# Patient Record
Sex: FEMALE | Race: WHITE | Employment: OTHER | RURAL
[De-identification: names, ages, dates, MRNs, and addresses within clinical notes are randomized per-mention and may not be internally consistent; named-entity substitution may affect disease eponyms.]

---

## 2017-02-27 ENCOUNTER — OFFICE VISIT (OUTPATIENT)
Dept: FAMILY MEDICINE CLINIC | Age: 82
End: 2017-02-27

## 2017-02-27 VITALS
DIASTOLIC BLOOD PRESSURE: 70 MMHG | HEART RATE: 70 BPM | OXYGEN SATURATION: 94 % | WEIGHT: 147 LBS | RESPIRATION RATE: 20 BRPM | SYSTOLIC BLOOD PRESSURE: 130 MMHG

## 2017-02-27 DIAGNOSIS — K13.70 MOUTH LESION: Primary | ICD-10-CM

## 2017-02-27 NOTE — PROGRESS NOTES
Chief Complaint   Patient presents with    Gum Problem         HPI:       is a 80 y.o. female.  recently . She has dementia. No recent behavioral challenges. Noted to have a spot on her gums which is not painful-it is simply there and wants to have this evaluated. Sees Dr Zee Ledesma. Several crowns. Immunocompetent. Allergies   Allergen Reactions    Celebrex [Celecoxib] Not Reported This Time    Pcn [Penicillins] Not Reported This Time       Current Outpatient Prescriptions   Medication Sig    ferrous sulfate 325 mg (65 mg iron) tablet Take  by mouth two (2) times a day.  docusate calcium (SURFAK) 240 mg capsule Take 240 mg by mouth daily.  conjugated estrogens (PREMARIN) 0.625 mg/gram vaginal cream Insert 30 g into vagina daily.  raloxifene (EVISTA) 60 mg tablet Take  by mouth daily.  multivitamin (ONE A DAY) tablet Take 1 Tab by mouth daily.  ascorbic acid (VITAMIN C) 500 mg tablet Take  by mouth.  acetaminophen (TYLENOL) 325 mg tablet Take  by mouth every four (4) hours as needed for Pain.  clindamycin (CLEOCIN) 300 mg capsule Take 300 mg by mouth as needed (For dental work).  diphenhydrAMINE (BENADRYL ALLERGY) 25 mg tablet Take 25 mg by mouth every six (6) hours as needed for Sleep. No current facility-administered medications for this visit. Past Medical History:   Diagnosis Date    Anxiety     Arthritis     DJD     Dementia     Diverticulitis     Sigmoid    HTN (hypertension)     Hyperlipidemia     Osteoporosis          ROS:  Denies fever, chills, cough, chest pain, SOB,  nausea, vomiting, or diarrhea. Denies wt loss, wt gain, hemoptysis, hematochezia or melena.     Physical Examination:    Visit Vitals    /70 (BP 1 Location: Right arm, BP Patient Position: Sitting)    Pulse 70    Resp 20    Wt 147 lb (66.7 kg)    SpO2 94%     General: Alert and Ox2 Fluent speech  HEENT:  NC/AT, EOMI, OP: clear      She has a red paplule 4 mm diameter and 2 mm high on the buccal aspect of her gingiva adjacent to #18  Neck:  Supple, no adenopathy, JVD, mass or bruit  Chest:  Clear to Ausculation, without wheezes, rales, rubs or ronchi  Cardiac: RRR  Abdomen:  +BS, soft, nontender without palpable HSM  Extremities:  No cyanosis, clubbing or edema  Neurologic:  Ambulatory without assist, CN 2-12 grossly intact. Moves all extremities. Skin: no rash  Lymphadenopathy: no cervical or supraclavicular nodes      ASSESSMENT AND PLAN:     1. Gingival granuloma, vs infection vs maligancy:  appt to be scheduled with Dr Bree Jacobo for further evaluation. No orders of the defined types were placed in this encounter.       Moe Kowalski MD, [address]

## 2017-04-03 ENCOUNTER — OFFICE VISIT (OUTPATIENT)
Dept: FAMILY MEDICINE CLINIC | Age: 82
End: 2017-04-03

## 2017-04-03 VITALS
SYSTOLIC BLOOD PRESSURE: 126 MMHG | HEART RATE: 73 BPM | WEIGHT: 146.8 LBS | OXYGEN SATURATION: 97 % | RESPIRATION RATE: 18 BRPM | DIASTOLIC BLOOD PRESSURE: 78 MMHG

## 2017-04-03 DIAGNOSIS — Z00.00 MEDICARE ANNUAL WELLNESS VISIT, SUBSEQUENT: ICD-10-CM

## 2017-04-03 DIAGNOSIS — M15.9 PRIMARY OSTEOARTHRITIS INVOLVING MULTIPLE JOINTS: Primary | ICD-10-CM

## 2017-04-03 RX ORDER — DIVALPROEX SODIUM 125 MG/1
125 CAPSULE, COATED PELLETS ORAL 2 TIMES DAILY
COMMUNITY

## 2017-04-03 NOTE — ACP (ADVANCE CARE PLANNING)
Tiki has STM problems but is amazingly able with other aspects of her cognition. In the event that she is unable to speak for herself, please contact Khris Mendoza who is her legal POA. He can be reached at [phone].     Yamileth Houser

## 2017-04-03 NOTE — PROGRESS NOTES
Chief Complaint   Patient presents with    Arthritis         HPI:       is a 80 y.o. female. [de-identified] witty woman with isolated STM deficit.  recently passed away. Not much in the way of health problems other than memory and OA sx. No fever, chills, or redness. She is ambulatory. S/P left TKR    Allergies   Allergen Reactions    Celebrex [Celecoxib] Not Reported This Time    Pcn [Penicillins] Not Reported This Time       Current Outpatient Prescriptions   Medication Sig    divalproex (DEPAKOTE SPRINKLES) 125 mg capsule Take 125 mg by mouth two (2) times a day.  ferrous sulfate 325 mg (65 mg iron) tablet Take  by mouth daily.  docusate calcium (SURFAK) 240 mg capsule Take 240 mg by mouth daily.  multivitamin (ONE A DAY) tablet Take 1 Tab by mouth daily.  conjugated estrogens (PREMARIN) 0.625 mg/gram vaginal cream Insert 30 g into vagina daily.  raloxifene (EVISTA) 60 mg tablet Take  by mouth daily.  ascorbic acid (VITAMIN C) 500 mg tablet Take  by mouth.  acetaminophen (TYLENOL) 325 mg tablet Take  by mouth every four (4) hours as needed for Pain.  clindamycin (CLEOCIN) 300 mg capsule Take 300 mg by mouth as needed (For dental work).  diphenhydrAMINE (BENADRYL ALLERGY) 25 mg tablet Take 25 mg by mouth every six (6) hours as needed for Sleep. No current facility-administered medications for this visit. Past Medical History:   Diagnosis Date    Anxiety     Arthritis     DJD     Dementia     Diverticulitis     Sigmoid    HTN (hypertension)     Hyperlipidemia     Osteoporosis          ROS:  Denies fever, chills, cough, chest pain, SOB,  nausea, vomiting, or diarrhea. Denies wt loss, wt gain, hemoptysis, hematochezia or melena. Physical Examination:    /78  Pulse 73  Resp 18  Wt 146 lb 12.8 oz (66.6 kg)  SpO2 97%    General: Alert and O x 2. Fluent,  Cheerful. Animated.   HEENT:  NC/AT, EOMI, OP: clear  Neck:  Supple, no adenopathy, JVD, mass or bruit  Chest:  Clear to Ausculation, without wheezes, rales, rubs or ronchi  Cardiac: RRR  Abdomen:  +BS, soft, nontender without palpable HSM  Extremities:  No cyanosis, clubbing or edema;  Heberdens nodes  Neurologic:  Ambulatory without assist, CN 2-12 grossly intact. Moves all extremities. Skin: no rash  Lymphadenopathy: no cervical or supraclavicular nodes      ASSESSMENT AND PLAN:     1. Osteoarthritis:  Conservative measures advised. Tylenol, Advil prn    Orders Placed This Encounter    divalproex (DEPAKOTE SPRINKLES) 125 mg capsule     Sig: Take 125 mg by mouth two (2) times a day. Saintclair Conn, MD, FACP        ______________________________________________________________________    Lynn Ram is a 80 y.o. female and presents for annual Medicare Wellness Visit. Problem List: Reviewed with patient and discussed risk factors. Patient Active Problem List   Diagnosis Code    HTN (hypertension) I10    Hyperlipidemia E78.5    Advanced care planning/counseling discussion Z71.89       Current medical providers:  Patient Care Team:  Christine Alba MD as PCP - General (Internal Medicine)  Mehnaz Blank MD (General Surgery)    [facility], [facility], Medications/Allergies: reviewed, on chart. Female Alcohol Screening: On any occasion during the past 3 months, have you had more than 3 drinks containing alcohol? No    Do you average more than 7 drinks per week? No    ROS:  Constitutional: No fever, chills or weight loss  Respiratory: No cough, SOB   CV: No chest pain or Palpitations    Objective:  Visit Vitals    /78    Pulse 73    Resp 18    Wt 146 lb 12.8 oz (66.6 kg)    SpO2 97%    There is no height or weight on file to calculate BMI. Assessment of cognitive impairment: Alert and oriented x name and location only. She has a very sharply defined STM defect. Other aspects of higher cortical function remain reasonably intact.       Depression Screen: PHQ 2 / 9, over the last two weeks 4/3/2017   Little interest or pleasure in doing things Not at all   Feeling down, depressed or hopeless Not at all   Total Score PHQ 2 0       Fall Risk Assessment:    Fall Risk Assessment, last 12 mths 4/3/2017   Able to walk? Yes   Fall in past 12 months? No   Fall with injury? -   Number of falls in past 12 months -   Fall Risk Score -       Functional Ability:   Does the patient exhibit a steady gait? yes   How long did it take the patient to get up and walk from a sitting position? 12 sec   Is the patient self reliant?  (ie can do own laundry, meals, household chores)  yes     Does the patient handle his/her own medications?  no     Does the patient handle his/her own money? no     Is the patients home safe (ie good lighting, handrails on stairs and bath, etc.)? yes     Did you notice or did patient express any hearing difficulties? yes     Did you notice or did patient express any vision difficulties? no       Advance Care Planning:   Patient was offered the opportunity to discuss advance care planning:  yes     Does patient have an Advance Directive:  Yes;  Mr Eddie Jones is her POA and he can be reached at [phone]   If no, did you provide information on [facility]?  no       Plan:      Orders Placed This Encounter    divalproex (DEPAKOTE SPRINKLES) 125 mg capsule       Health Maintenance   Topic Date Due    MEDICARE YEARLY EXAM  04/08/2017    GLAUCOMA SCREENING Q2Y  02/04/2019    DTaP/Tdap/Td series (2 - Td) 02/04/2027    OSTEOPOROSIS SCREENING (DEXA)  Addressed    ZOSTER VACCINE AGE 60>  Addressed    Pneumococcal 65+ Low/Medium Risk  Completed    INFLUENZA AGE 9 TO ADULT  Completed       *Patient verbalized understanding and agreement with the plan. A copy of the After Visit Summary with personalized health plan was given to the patient today.

## 2018-11-15 ENCOUNTER — CLINICAL SUPPORT (OUTPATIENT)
Dept: CARDIOLOGY CLINIC | Age: 83
End: 2018-11-15

## 2018-11-15 DIAGNOSIS — R00.1 BRADYCARDIA: ICD-10-CM

## 2018-11-15 DIAGNOSIS — R55 VASOVAGAL SYNCOPE: ICD-10-CM

## 2018-11-15 NOTE — PROGRESS NOTES
24 hour Holter monitor only. Verified patient with two patient identifiers. Pt verbalized understanding of its use. Also informed Stanislaw Vazquez with [facility] (she was with the pt during the application). Ordering [employer]  Reason: Syncope, bradycardia. Start time: 3:45pm  Return date: 11/16/18        No LOS.

## 2018-11-16 ENCOUNTER — DOCUMENTATION ONLY (OUTPATIENT)
Dept: CARDIOLOGY CLINIC | Age: 83
End: 2018-11-16

## 2018-11-16 NOTE — PROGRESS NOTES
Holter returned by [facility] staff and recordings have been downloaded. Diary states: Resident has taken off device and leads at 2030.

## 2018-11-20 NOTE — PROGRESS NOTES
Received a call from Chichi Negron with [address]. Chichi Negron informed me that the 24 hr monitor only has 1 hour worth of data. Chichi Negron wants to know if the monitor should be processed? Informed her that I will have to discuss with Tony Franco NP (ordering provider) and my manager. Per Tracee: nothing is abnormal on the 1 hour recording.

## 2018-11-27 NOTE — PROGRESS NOTES
Per Abel Hernadez NP:    I would not worry about it. ECHO was good.  Probably vasovagal. Don';t send in and will not re-schedule   Aleah Florez

## 2018-11-28 NOTE — PROGRESS NOTES
Per Jessica Gillespie (management of [facility]):  No service charge for holter application due to pt removing monitor after one hour of wear.     I did let the business office know. Spoke with [facility].

## 2018-11-28 NOTE — PROGRESS NOTES
Per Baltazar Deal (management of [facility]):  No service charge for holter application. I did let the business office know. Spoke with [facility].

## 2021-06-16 ENCOUNTER — HOSPITAL ENCOUNTER (OUTPATIENT)
Dept: LAB | Age: 86
Discharge: HOME | End: 2021-06-16
Payer: COMMERCIAL

## 2021-06-16 LAB
BASOPHILS # BLD: 0 K/UL (ref 0–0.1)
BASOPHILS NFR BLD: 1 % (ref 0–1)
DIFFERENTIAL METHOD BLD: ABNORMAL
EOSINOPHIL # BLD: 0.1 K/UL (ref 0–0.4)
EOSINOPHIL NFR BLD: 1 % (ref 0–7)
ERYTHROCYTE [DISTWIDTH] IN BLOOD BY AUTOMATED COUNT: 14 % (ref 11.5–14.5)
HCT VFR BLD AUTO: 36.7 % (ref 35–47)
HGB BLD-MCNC: 11.7 G/DL (ref 11.5–16)
IMM GRANULOCYTES # BLD AUTO: 0 K/UL (ref 0–0.04)
IMM GRANULOCYTES NFR BLD AUTO: 0 % (ref 0–0.5)
LYMPHOCYTES # BLD: 0.9 K/UL (ref 0.8–3.5)
LYMPHOCYTES NFR BLD: 14 % (ref 12–49)
MCH RBC QN AUTO: 29.2 PG (ref 26–34)
MCHC RBC AUTO-ENTMCNC: 31.9 G/DL (ref 30–36.5)
MCV RBC AUTO: 91.5 FL (ref 80–99)
MONOCYTES # BLD: 0.4 K/UL (ref 0–1)
MONOCYTES NFR BLD: 6 % (ref 5–13)
NEUTS SEG # BLD: 4.7 K/UL (ref 1.8–8)
NEUTS SEG NFR BLD: 78 % (ref 32–75)
NRBC # BLD: 0 K/UL (ref 0–0.01)
NRBC BLD-RTO: 0 PER 100 WBC
PLATELET # BLD AUTO: 261 K/UL (ref 150–400)
PMV BLD AUTO: 10.3 FL (ref 8.9–12.9)
RBC # BLD AUTO: 4.01 M/UL (ref 3.8–5.2)
WBC # BLD AUTO: 6.1 K/UL (ref 3.6–11)

## 2021-06-16 PROCEDURE — 85025 COMPLETE CBC W/AUTO DIFF WBC: CPT

## 2021-06-22 ENCOUNTER — HOSPITAL ENCOUNTER (OUTPATIENT)
Dept: LAB | Age: 86
Discharge: HOME | End: 2021-06-22
Payer: COMMERCIAL

## 2021-06-22 PROCEDURE — 87186 SC STD MICRODIL/AGAR DIL: CPT

## 2021-06-22 PROCEDURE — 87077 CULTURE AEROBIC IDENTIFY: CPT

## 2021-06-22 PROCEDURE — 87205 SMEAR GRAM STAIN: CPT

## 2021-06-24 LAB
BACTERIA SPEC CULT: ABNORMAL
GRAM STN SPEC: ABNORMAL
SERVICE CMNT-IMP: ABNORMAL

## 2023-05-11 RX ORDER — DIPHENOXYLATE HYDROCHLORIDE AND ATROPINE SULFATE 2.5; .025 MG/1; MG/1
TABLET ORAL 4 TIMES DAILY PRN
COMMUNITY

## 2023-05-11 RX ORDER — IBUPROFEN 400 MG/1
TABLET ORAL EVERY 6 HOURS PRN
COMMUNITY

## 2023-05-11 RX ORDER — DOCUSATE CALCIUM 240 MG
CAPSULE ORAL EVERY OTHER DAY
COMMUNITY

## 2023-05-11 RX ORDER — CLINDAMYCIN HYDROCHLORIDE 300 MG/1
CAPSULE ORAL PRN
COMMUNITY

## 2023-05-11 RX ORDER — FERROUS SULFATE 325(65) MG
TABLET ORAL DAILY
COMMUNITY

## 2023-05-11 RX ORDER — ACETAMINOPHEN 325 MG/1
TABLET ORAL 2 TIMES DAILY
COMMUNITY

## 2023-05-11 RX ORDER — DIVALPROEX SODIUM 125 MG/1
CAPSULE, COATED PELLETS ORAL 2 TIMES DAILY
COMMUNITY